# Patient Record
Sex: FEMALE | URBAN - METROPOLITAN AREA
[De-identification: names, ages, dates, MRNs, and addresses within clinical notes are randomized per-mention and may not be internally consistent; named-entity substitution may affect disease eponyms.]

---

## 2020-02-28 ENCOUNTER — APPOINTMENT (RX ONLY)
Dept: URBAN - METROPOLITAN AREA CLINIC 377 | Facility: CLINIC | Age: 65
Setting detail: DERMATOLOGY
End: 2020-02-28

## 2020-02-28 DIAGNOSIS — L90.5 SCAR CONDITIONS AND FIBROSIS OF SKIN: ICD-10-CM

## 2020-02-28 PROCEDURE — ? ADDITIONAL NOTES

## 2020-02-28 PROCEDURE — ? PHOTO-DOCUMENTATION

## 2020-02-28 PROCEDURE — ? COUNSELING

## 2020-02-28 PROCEDURE — 99202 OFFICE O/P NEW SF 15 MIN: CPT

## 2020-02-28 PROCEDURE — ? TREATMENT REGIMEN

## 2020-02-28 ASSESSMENT — LOCATION ZONE DERM: LOCATION ZONE: LIP

## 2020-02-28 ASSESSMENT — LOCATION DETAILED DESCRIPTION DERM
LOCATION DETAILED: PHILTRUM
LOCATION DETAILED: LEFT UPPER CUTANEOUS LIP

## 2020-02-28 ASSESSMENT — LOCATION SIMPLE DESCRIPTION DERM
LOCATION SIMPLE: LEFT LIP
LOCATION SIMPLE: UPPER LIP

## 2020-02-28 NOTE — PROCEDURE: ADDITIONAL NOTES
Additional Notes: Discussed treatment options, re-excision with plastics vs consult for potential laser therapy. Recommend consult with Dr. Marshall to discuss any laser therapy options. All questions answered.
Detail Level: Simple

## 2020-02-28 NOTE — HPI: SCAR
How Severe Is Your Scar?: mild
Is This A New Presentation, Or A Follow-Up?: Scar
Additional History: Pt would ,Blas to have a cleft scar evaluated with either surgery or possible treatment. She has had surgery twice to minimize scaring.

## 2020-08-13 ENCOUNTER — APPOINTMENT (RX ONLY)
Dept: URBAN - METROPOLITAN AREA CLINIC 170 | Facility: CLINIC | Age: 65
Setting detail: DERMATOLOGY
End: 2020-08-13

## 2020-08-13 DIAGNOSIS — L90.5 SCAR CONDITIONS AND FIBROSIS OF SKIN: ICD-10-CM

## 2020-08-13 PROCEDURE — ? COUNSELING

## 2020-08-13 PROCEDURE — 99213 OFFICE O/P EST LOW 20 MIN: CPT

## 2020-08-13 ASSESSMENT — LOCATION DETAILED DESCRIPTION DERM: LOCATION DETAILED: LEFT UPPER CUTANEOUS LIP

## 2020-08-13 ASSESSMENT — LOCATION SIMPLE DESCRIPTION DERM: LOCATION SIMPLE: LEFT LIP

## 2020-08-13 ASSESSMENT — LOCATION ZONE DERM: LOCATION ZONE: LIP

## 2020-08-13 NOTE — HPI: OTHER
Condition:: Scar post surgery for cleft lip repair 3 years ago. No change, asymptomatic, no treatment, patient wants to see if PDL will improve it.
Please Describe Your Condition:: No pain, asymptomatic, no treatment.

## 2021-05-14 NOTE — PROCEDURE: COUNSELING
Patient Specific Counseling (Will Not Stick From Patient To Patient): No violaceous areas that are a good candidate for PDL. Discussed microneedling and plastic surgery referral for rescission, but discussed that both would be cosmetic in nature. Pt declined treatment.
Detail Level: Detailed
[All other systems negative] : All other systems negative